# Patient Record
(demographics unavailable — no encounter records)

---

## 2025-06-10 NOTE — PHYSICAL EXAM
[Antalgic] : antalgic [Walker] : ambulates with walker [LE] : Sensory: Intact in bilateral lower extremities [DP] : dorsalis pedis 2+ and symmetric bilaterally [Normal RLE] : Right Lower Extremity: No scars, rashes, lesions, ulcers, skin intact [Normal Touch] : sensation intact for touch [Obese] : obese [Normal] : no peripheral adenopathy appreciated [de-identified] : On physical exam of the right hip skin is warm and dry without erythema ecchymosis signs or symptoms of infection superficial or deep.  There is tenderness noted at the lateral thigh.  Range of motion is 90 degrees of flexion, 0 degrees of internal rotation with pain and 20 degrees of external rotation. She is unable to lift the leg to perform a SLR.  Strength is maintained in all planes.  Sensation is intact throughout the distal lower extremity.  There are 2+ dorsalis pedis pulse.  Leg lengths appear equal measured at the medial malleolus. Negative Lennie's exam  [de-identified] : 2 views of the right hip and pelvis were performed in the office today including weightbearing AP and frog-leg views.  These x-rays show significant bony changes when comparing to previous x-rays.  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

## 2025-06-10 NOTE — DISCUSSION/SUMMARY
[de-identified] : Treatment options were reviewed in detail with the patient.  Recommend at this point in time to get an MRI and a CAT scan to evaluate for possible pseudotumor and surgical planning.  Also recommend getting a CBC CRP sed rate cobalt chromium and nickel ion levels.  Prescriptions for MRI CT and blood work were given to the patient.  She will follow-up after the lab work and imaging results to review and discuss next steps.  Advised her to continue to ambulate with the walker to help take pressure off of the leg. All patients questions and concerns were addressed to satisfaction. Advised patient to call the office if any new or worsening symptoms arise.

## 2025-06-10 NOTE — END OF VISIT
[FreeTextEntry3] : I, Dr. Conrad, personally performed the evaluation and management (E/M) services for this established patient who presents today with an exacerbation of an existing condition. That E/M includes conducting the clinically appropriate interval history &/or exam, assessing all new/exacerbated conditions, and establishing a new plan of care. Today, my NIEVES, Forrest Sarabia PA-C, was here to observe my evaluation and management service for this new problem/exacerbated condition and follow the plan of care established by me going forward.

## 2025-06-10 NOTE — HISTORY OF PRESENT ILLNESS
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  She states since last office visit her pain is significantly worsening.  It can be to the point where it is a 10+ out of 10 and she is unable to put any weight on her right leg.  She is ambulating with a rolling walker to help take pressure off of her leg however she is still having significant discomfort.  Pain is usually worse to the lateral thigh area and into the groin as well.  Is very difficult to perform activities of daily living.  She denies any history of injuries which may have precipitated the pain. Denies fevers, chills, chest pain, calf pain, shortness of breath.